# Patient Record
Sex: FEMALE
[De-identification: names, ages, dates, MRNs, and addresses within clinical notes are randomized per-mention and may not be internally consistent; named-entity substitution may affect disease eponyms.]

---

## 2021-02-26 PROBLEM — Z00.00 ENCOUNTER FOR PREVENTIVE HEALTH EXAMINATION: Status: ACTIVE | Noted: 2021-02-26

## 2021-04-08 ENCOUNTER — APPOINTMENT (OUTPATIENT)
Dept: ENDOCRINOLOGY | Facility: CLINIC | Age: 46
End: 2021-04-08
Payer: MEDICARE

## 2021-04-08 VITALS
WEIGHT: 155 LBS | DIASTOLIC BLOOD PRESSURE: 72 MMHG | HEIGHT: 64 IN | HEART RATE: 83 BPM | SYSTOLIC BLOOD PRESSURE: 120 MMHG | BODY MASS INDEX: 26.46 KG/M2

## 2021-04-08 DIAGNOSIS — Z83.3 FAMILY HISTORY OF DIABETES MELLITUS: ICD-10-CM

## 2021-04-08 DIAGNOSIS — E55.9 VITAMIN D DEFICIENCY, UNSPECIFIED: ICD-10-CM

## 2021-04-08 DIAGNOSIS — N92.6 IRREGULAR MENSTRUATION, UNSPECIFIED: ICD-10-CM

## 2021-04-08 PROCEDURE — 99203 OFFICE O/P NEW LOW 30 MIN: CPT

## 2021-04-08 PROCEDURE — 99072 ADDL SUPL MATRL&STAF TM PHE: CPT

## 2021-04-14 LAB
25(OH)D3 SERPL-MCNC: 22.5 NG/ML
ANION GAP SERPL CALC-SCNC: 9 MMOL/L
ANTI-MUELLERIAN HORMONE: 2.89 NG/ML
BUN SERPL-MCNC: 8 MG/DL
CALCIUM SERPL-MCNC: 10 MG/DL
CHLORIDE SERPL-SCNC: 106 MMOL/L
CO2 SERPL-SCNC: 25 MMOL/L
CREAT SERPL-MCNC: 0.82 MG/DL
FSH SERPL-MCNC: 1.6 IU/L
GLUCOSE SERPL-MCNC: 106 MG/DL
LH SERPL-ACNC: 2.6 IU/L
POTASSIUM SERPL-SCNC: 4.3 MMOL/L
SODIUM SERPL-SCNC: 141 MMOL/L
TESTOST SERPL-MCNC: 7.5 NG/DL

## 2021-04-16 NOTE — CONSULT LETTER
[Dear  ___] : Dear  [unfilled], [Consult Letter:] : I had the pleasure of evaluating your patient, [unfilled]. [Please see my note below.] : Please see my note below. [Sincerely,] : Sincerely, [FreeTextEntry1] : Ms. Romero has vitamin D deficiency, which I recommend she replete since pregnancy and nursing will be a stress on her bones.\par Her sex hormone profile was normal.\par  [FreeTextEntry3] : Sherry Lynch MD\par Division of Endocrinology\par [facility]  [DrWilmer  ___] : Dr. MATTHEWS

## 2021-04-16 NOTE — PHYSICAL EXAM
[Alert] : alert [No Acute Distress] : no acute distress [No Proptosis] : no proptosis [No Lid Lag] : no lid lag [Normal Hearing] : hearing was normal [No LAD] : no lymphadenopathy [Thyroid Not Enlarged] : the thyroid was not enlarged [Clear to Auscultation] : lungs were clear to auscultation bilaterally [Normal S1, S2] : normal S1 and S2 [Regular Rhythm] : with a regular rhythm [No Stigmata of Cushings Syndrome] : no stigmata of Cushings Syndrome [Normal Affect] : the affect was normal [Normal Mood] : the mood was normal [Acanthosis Nigricans] : no acanthosis nigricans [de-identified] : palpable thyroid, soft

## 2021-04-16 NOTE — ASSESSMENT
[FreeTextEntry1] : Irregular bleeding (heavier), likely due to fibroids.  Desiring fertility. Vitamin D deficiency.\par will check FSH and AMH and unfortunately, her age will likely be the biggest factor impacting fertility. Will check testosterone though she does not have symptoms of hyperandrogenism.\par Recommended replacing vitamin D, 1000 units/day; pregnancy will be a stress on her bones, so optimizing bone health will be good.\par Maintain healthy weight

## 2021-04-16 NOTE — HISTORY OF PRESENT ILLNESS
[FreeTextEntry1] : Here for  medical fertility evaluation.\par Recently having prolonged bleeding -- 2-3 weeks.  Had myomectomy in 2017 for fibroids but not all could be removed.\yao Became pregnancy in 2018 but had miscarriage, early on\par Was seeing repro endo, had gone through workup and was about to proceed with injections but then job/insurance changed\par Menarche at age 10.  Periods always regular and still are regular, just bleeding is lasting longer\yao Feels hot at night, but apartment is kept hot ( is also hot).  no night sweats, insomnia or anxiety\par weight has been stable,  she gained a few lb during Covid but recently lost 2 lb. Before age 29, her weight was in the 120s but she attributes weight gain due to being more sedentary (due to nature of her job).\par Labs from 2/21 reviewed: mostly normal, vitamin D is low.  SHe is taking a women's multivitamin and trying to be in the sun more\par \par Meds:\par women's multivitamin